# Patient Record
Sex: FEMALE | Race: WHITE
[De-identification: names, ages, dates, MRNs, and addresses within clinical notes are randomized per-mention and may not be internally consistent; named-entity substitution may affect disease eponyms.]

---

## 2017-07-25 NOTE — MM
Reason for exam: screening  (asymptomatic).

Last mammogram was performed 3 years ago.



Physical Findings:

A clinical breast exam by your physician is recommended on an annual basis and 

results should be correlated with mammographic findings.



MG Screening Mammo w CAD

Bilateral CC and MLO view(s) were taken.

Prior study comparison: July 24, 2014, right breast MG diagnostic mammo RT w CAD. 

December 5, 2013, [facility] DIGITAL RIGHT MAMMOGRAM w/CAD.

The breast tissue is heterogeneously dense. This may lower the sensitivity of 

mammography.  There is no discrete abnormality including area of concern.  No 

significant changes when compared with prior studies.





ASSESSMENT: Incomplete: need additional imaging evaluation, BI-RAD 0



RECOMMENDATION:

Ultrasound of the right breast.



[facility] will attempt to contact patient  to return for ultrasound.

## 2017-07-26 NOTE — USB
Reason for exam: additional evaluation requested from abnormal screening.



Physical Findings:

Nurse Summary: noticed "large" lump 1 week ago, painful (nurse kp).



US Breast Workup Limited RT

Right breast ultrasound demonstrates a 1.8 x 1.5 x 0.6cm oval, solid, hypoechoic 

lesion at 12 o'clock.



These results were verbally communicated with the patient and result sheet given 

to the patient on 7/26/17.





ASSESSMENT: Suspicious, BI-RAD 4



RECOMMENDATION:

Ultrasound core biopsy of the right breast.



Called Dr. Stromberg with mammographic findings and has scheduled an appointment 

for the patient for 8/17/17 at 9:45 with Dr. Mauro.

Biopsy scheduled for 7/26/17 at 1 o'clock.





PRELIMINARY REPORT CALLED AND FAXED TO DR. MAURO ON 7/26/17 /TP.

## 2017-07-26 NOTE — USB
EXAMINATION TYPE: US biopsy breast VAD RT

 

DATE OF EXAM: 7/26/2017

 

CLINICAL HISTORY: R92.8 ABN MAMMO.

 

TECHNIQUE: Ultrasound guided core biopsy of right 12:00 breast.  

 

COMPARISON: NONE

 

FINDINGS: The procedure of ultrasound guided core biopsy was explained to the patient.  Benefits, alt
ernatives, and risks were discussed.  An informed consent was then obtained.  

 

The patient was placed in supine positioning for  imaging and for the procedure. The overlying skin w
as prepped and draped in usual sterile fashion.  Lidocaine buffered with bicarbonate was used as anes
thetic into the skin and subcutaneous tissue up to area of concern in the right breast.  A rodríguez was m
kathie with surgical scalpel.  

 

Under ultrasound guidance, a 12-gauge vacuum assisted biopsy gun device was used to obtain 4 core cecilia
ples.  Following this, a biopsy clip was left in lesion.  

 

The patient tolerated the procedure well without any immediate complication.  The patient was kept in
 the radiology department for short stay after the procedure and then discharged home in stable condi
tion.  

 

 

 

IMPRESSION: Successful, uncomplicated ultrasound guided core biopsy of area of concern in the right 1
2:00 breast, full pathology results to follow.

## 2018-05-09 NOTE — US
EXAMINATION TYPE: US thyroid st tissue head/neck

 

DATE OF EXAM: 5/9/2018

 

COMPARISON: 7/24/2015

 

CLINICAL HISTORY: E04.2 MULTINODULAR GOITER. follow up exam

 

GLAND SIZE:

 

Right Lobe: 5.3 x 1.8 x 2.1 cm

** Overall Parenchyma:  heterogenous

Left Lobe: 4.9 x 1.6 x 1.4 cm

** Overall Parenchyma:  heterogeneous

Isthmus Thickness:  0.3 cm

 

NODULES

 

RIGHT:   # of nodules measured on right: 3

1.  1.6 X 1.2  x 0.9 cm hypoechoic solid nodule at the mid pole with mid well-definedmargins.  This n
odule is wider than tall and shows intranodular vascularity.

** Prior size: 1.5 x 1.2  x 0.9 cm 

2. 0.8 X 0.7  x 0.6 cm  solid nodule at the mid pole with well-defined margins.  This nodule is wider
 than tall and shows intranodular vascularity.

 ** Prior size:  0.8 x 0.5  x 0.7 cm 

3. 0.8 X 0.8  x 0.5 cm mixed nodule at the lower pole with well-defined margins.  This nodule is wide
r than tall and shows intranodular vascularity.

** Prior size:  unsure if noted previously

 

 

LEFT:    # of nodules measured on left:  1

1.  1.4 X 1.0  x 0.9 cm isoechoic solid nodule at the lower pole with well-defined margins.  This nod
ule is wider than tall and shows intranodular vascularity.

** Prior size: 1.3 x 1.0  x 0.7 cm 

 

ISTHMUS:    # of nodules measured in the isthmus:  0

 

 

Bilateral neck scanned, no evidence of lymphadenopathy.

 

 

 

 

 

IMPRESSION:

Findings consistent with a mild multinodular goiter. No dominant thyroid mass. No adverse change comp
ared to old exam.

## 2020-03-04 ENCOUNTER — HOSPITAL ENCOUNTER (EMERGENCY)
Dept: HOSPITAL 47 - EC | Age: 50
Discharge: HOME | End: 2020-03-04
Payer: COMMERCIAL

## 2020-03-04 VITALS
TEMPERATURE: 98.4 F | HEART RATE: 88 BPM | SYSTOLIC BLOOD PRESSURE: 133 MMHG | RESPIRATION RATE: 18 BRPM | DIASTOLIC BLOOD PRESSURE: 71 MMHG

## 2020-03-04 DIAGNOSIS — Z91.048: ICD-10-CM

## 2020-03-04 DIAGNOSIS — Z87.891: ICD-10-CM

## 2020-03-04 DIAGNOSIS — Q07.00: ICD-10-CM

## 2020-03-04 DIAGNOSIS — J15.9: Primary | ICD-10-CM

## 2020-03-04 LAB
ALBUMIN SERPL-MCNC: 4.5 G/DL (ref 3.5–5)
ALP SERPL-CCNC: 42 U/L (ref 38–126)
ALT SERPL-CCNC: 22 U/L (ref 4–34)
ANION GAP SERPL CALC-SCNC: 9 MMOL/L
APTT BLD: 22.9 SEC (ref 22–30)
AST SERPL-CCNC: 24 U/L (ref 14–36)
BASOPHILS # BLD AUTO: 0 K/UL (ref 0–0.2)
BASOPHILS NFR BLD AUTO: 0 %
BUN SERPL-SCNC: 13 MG/DL (ref 7–17)
CALCIUM SPEC-MCNC: 9.4 MG/DL (ref 8.4–10.2)
CHLORIDE SERPL-SCNC: 104 MMOL/L (ref 98–107)
CO2 SERPL-SCNC: 25 MMOL/L (ref 22–30)
D DIMER PPP FEU-MCNC: 0.22 MG/L FEU (ref ?–0.6)
EOSINOPHIL # BLD AUTO: 0 K/UL (ref 0–0.7)
EOSINOPHIL NFR BLD AUTO: 0 %
ERYTHROCYTE [DISTWIDTH] IN BLOOD BY AUTOMATED COUNT: 5.04 M/UL (ref 3.8–5.4)
ERYTHROCYTE [DISTWIDTH] IN BLOOD: 12.7 % (ref 11.5–15.5)
GLUCOSE SERPL-MCNC: 133 MG/DL (ref 74–99)
HCT VFR BLD AUTO: 43.7 % (ref 34–46)
HGB BLD-MCNC: 14.3 GM/DL (ref 11.4–16)
INR PPP: 0.9 (ref ?–1.2)
LYMPHOCYTES # SPEC AUTO: 0.5 K/UL (ref 1–4.8)
LYMPHOCYTES NFR SPEC AUTO: 7 %
MAGNESIUM SPEC-SCNC: 2.1 MG/DL (ref 1.6–2.3)
MCH RBC QN AUTO: 28.4 PG (ref 25–35)
MCHC RBC AUTO-ENTMCNC: 32.7 G/DL (ref 31–37)
MCV RBC AUTO: 86.7 FL (ref 80–100)
MONOCYTES # BLD AUTO: 0.2 K/UL (ref 0–1)
MONOCYTES NFR BLD AUTO: 2 %
NEUTROPHILS # BLD AUTO: 5.8 K/UL (ref 1.3–7.7)
NEUTROPHILS NFR BLD AUTO: 89 %
PLATELET # BLD AUTO: 166 K/UL (ref 150–450)
POTASSIUM SERPL-SCNC: 4.3 MMOL/L (ref 3.5–5.1)
PROT SERPL-MCNC: 7.5 G/DL (ref 6.3–8.2)
PT BLD: 9.5 SEC (ref 9–12)
SODIUM SERPL-SCNC: 138 MMOL/L (ref 137–145)
WBC # BLD AUTO: 6.5 K/UL (ref 3.8–10.6)

## 2020-03-04 PROCEDURE — 96365 THER/PROPH/DIAG IV INF INIT: CPT

## 2020-03-04 PROCEDURE — 71275 CT ANGIOGRAPHY CHEST: CPT

## 2020-03-04 PROCEDURE — 85610 PROTHROMBIN TIME: CPT

## 2020-03-04 PROCEDURE — 71046 X-RAY EXAM CHEST 2 VIEWS: CPT

## 2020-03-04 PROCEDURE — 84484 ASSAY OF TROPONIN QUANT: CPT

## 2020-03-04 PROCEDURE — 83880 ASSAY OF NATRIURETIC PEPTIDE: CPT

## 2020-03-04 PROCEDURE — 85379 FIBRIN DEGRADATION QUANT: CPT

## 2020-03-04 PROCEDURE — 85025 COMPLETE CBC W/AUTO DIFF WBC: CPT

## 2020-03-04 PROCEDURE — 96375 TX/PRO/DX INJ NEW DRUG ADDON: CPT

## 2020-03-04 PROCEDURE — 36415 COLL VENOUS BLD VENIPUNCTURE: CPT

## 2020-03-04 PROCEDURE — 94640 AIRWAY INHALATION TREATMENT: CPT

## 2020-03-04 PROCEDURE — 85730 THROMBOPLASTIN TIME PARTIAL: CPT

## 2020-03-04 PROCEDURE — 80053 COMPREHEN METABOLIC PANEL: CPT

## 2020-03-04 PROCEDURE — 99285 EMERGENCY DEPT VISIT HI MDM: CPT

## 2020-03-04 PROCEDURE — 83735 ASSAY OF MAGNESIUM: CPT

## 2020-03-04 PROCEDURE — 87502 INFLUENZA DNA AMP PROBE: CPT

## 2020-03-04 PROCEDURE — 96361 HYDRATE IV INFUSION ADD-ON: CPT

## 2020-03-04 NOTE — XR
EXAMINATION TYPE: XR chest 2V

 

DATE OF EXAM: 3/4/2020

 

COMPARISON: NONE

 

HISTORY: Cough for 5 days.

 

TECHNIQUE:  Frontal and lateral views of the chest are obtained.

 

FINDINGS:  There is no focal air space opacity, pleural effusion, or pneumothorax seen.  The cardiac 
silhouette size is within normal limits.   The osseous structures are intact. Cholecystectomy clips n
oted.

 

IMPRESSION:  No suspicious acute pulmonary process.

## 2020-03-04 NOTE — ED
URI HPI





- General


Chief Complaint: Upper Respiratory Infection


Stated Complaint: MARIA TERESA/poss bronchitis


Time Seen by Provider: 20 16:19


Source: patient, RN notes reviewed, old records reviewed


Mode of arrival: ambulatory


Limitations: no limitations





- History of Present Illness


Initial Comments: 





This is a 49 female to the ED co sob cough and SOB, patient has no medical 

history has been on steroids and breathing treatments and inhaler with no 

significant improvement.  Patient has had continued cough and sob despite 

treatment.  Patient denies chest pain.


MD Complaint: fever, cough, nasal congestion


-: days(s)


Severity: moderate


Severity scale (1-10): 4


Quality: aching


Consistency: constant


Improves With: nothing


Worsens With: nothing


Associated Symptoms: denies other symptoms


Treatments Prior to Arrival: none





- Related Data


                                  Previous Rx's











 Medication  Instructions  Recorded


 


Albuterol Nebulized [Ventolin 2.5 mg INHALATION Q4H PRN #25 nebu 20





Nebulized]  


 


Azithromycin [Zithromax Z-pack] 0 mg PO AS DIRECTED #1 pack 20











                                    Allergies











Allergy/AdvReac Type Severity Reaction Status Date / Time


 


nickel Allergy  Rash/Hives Verified 20 16:03





   /RED SKIN  














Review of Systems


ROS Statement: 


Those systems with pertinent positive or pertinent negative responses have been 

documented in the HPI.





ROS Other: All systems not noted in ROS Statement are negative.





Past Medical History


Past Medical History: No Reported History


Additional Past Medical History / Comment(s): Arnold Chiari - brain malformation

,HIATAL HERNIA,


History of Any Multi-Drug Resistant Organisms: None Reported


Past Surgical History: Ablation,  Section, Cholecystectomy


Additional Past Surgical History / Comment(s): , emergency , 

vertical incisions,


Past Anesthesia/Blood Transfusion Reactions: Previous Problems w/ Anesthesia, 

Postoperative Nausea & Vomiting (PONV)


Additional Past Anesthesia/Blood Transfusion Reaction / Comment(s): during 

emergency  - SEE REPORTS-"FELT PAIN DURING GENERAL ANESTHETIC"


Past Psychological History: Anxiety


Smoking Status: Former smoker


Past Alcohol Use History: None Reported


Past Drug Use History: None Reported





- Past Family History


  ** Father


Family Medical History: Cancer


Additional Family Medical History / Comment(s): throat ca - biological father





  ** Mother


Family Medical History: Cancer


Additional Family Medical History / Comment(s): OVARIAN CANCER





General Exam


Limitations: no limitations


General appearance: alert, in no apparent distress


Head exam: Present: atraumatic, normocephalic, normal inspection


Eye exam: Present: normal appearance, PERRL, EOMI.  Absent: scleral icterus, 

conjunctival injection, periorbital swelling


ENT exam: Present: normal exam, mucous membranes moist


Neck exam: Present: normal inspection.  Absent: tenderness, meningismus, 

lymphadenopathy


Respiratory exam: Present: respiratory distress, wheezes, accessory muscle use, 

decreased breath sounds, prolonged expiratory.  Absent: rales, rhonchi, stridor


Cardiovascular Exam: Present: regular rate, normal rhythm, normal heart sounds. 

Absent: systolic murmur, diastolic murmur, rubs, gallop, clicks


GI/Abdominal exam: Present: soft, normal bowel sounds.  Absent: distended, 

tenderness, guarding, rebound, rigid


Extremities exam: Present: normal inspection, full ROM, normal capillary refill.

 Absent: tenderness, pedal edema, joint swelling, calf tenderness


Back exam: Present: normal inspection


Neurological exam: Present: alert, oriented X3, CN II-XII intact


Psychiatric exam: Present: normal affect, normal mood


Skin exam: Present: warm, dry, intact, normal color.  Absent: rash





Course


                                   Vital Signs











  20





  16:00 16:25 17:36


 


Temperature 98.2 F  


 


Pulse Rate 94  91


 


Respiratory 19 22 





Rate   


 


Blood Pressure 146/94  


 


O2 Sat by Pulse 99  





Oximetry   














  20





  17:55 19:00


 


Temperature  


 


Pulse Rate 99 102 H


 


Respiratory  22





Rate  


 


Blood Pressure  131/73


 


O2 Sat by Pulse  100





Oximetry  














- Reevaluation(s)


Reevaluation #1: 





20 19:19


medical record is reviewed


Reevaluation #2: 





20 19:59


Patient has no pain no shortness of breath feeling better with like discharge 

home


Reevaluation #3: 





20 19:59


Patient informed of results questions are answered, would like discharge





Medical Decision Making





- Medical Decision Making





49 female DF for persistent cough and congestion shortness of breath positive 

pneumonia here in the ER.  At this time patient can be discharged home





- Lab Data


Result diagrams: 


                                 20 17:15





                                 20 17:15


                                   Lab Results











  20 Range/Units





  16:04 17:15 17:15 


 


WBC   6.5   (3.8-10.6)  k/uL


 


RBC   5.04   (3.80-5.40)  m/uL


 


Hgb   14.3   (11.4-16.0)  gm/dL


 


Hct   43.7   (34.0-46.0)  %


 


MCV   86.7   (80.0-100.0)  fL


 


MCH   28.4   (25.0-35.0)  pg


 


MCHC   32.7   (31.0-37.0)  g/dL


 


RDW   12.7   (11.5-15.5)  %


 


Plt Count   166   (150-450)  k/uL


 


Neutrophils %   89   %


 


Lymphocytes %   7   %


 


Monocytes %   2   %


 


Eosinophils %   0   %


 


Basophils %   0   %


 


Neutrophils #   5.8   (1.3-7.7)  k/uL


 


Lymphocytes #   0.5 L   (1.0-4.8)  k/uL


 


Monocytes #   0.2   (0-1.0)  k/uL


 


Eosinophils #   0.0   (0-0.7)  k/uL


 


Basophils #   0.0   (0-0.2)  k/uL


 


PT    9.5  (9.0-12.0)  sec


 


INR    0.9  (<1.2)  


 


APTT    22.9  (22.0-30.0)  sec


 


D-Dimer    0.22  (<0.60)  mg/L FEU


 


Sodium     (137-145)  mmol/L


 


Potassium     (3.5-5.1)  mmol/L


 


Chloride     ()  mmol/L


 


Carbon Dioxide     (22-30)  mmol/L


 


Anion Gap     mmol/L


 


BUN     (7-17)  mg/dL


 


Creatinine     (0.52-1.04)  mg/dL


 


Est GFR (CKD-EPI)AfAm     (>60 ml/min/1.73 sqM)  


 


Est GFR (CKD-EPI)NonAf     (>60 ml/min/1.73 sqM)  


 


Glucose     (74-99)  mg/dL


 


Calcium     (8.4-10.2)  mg/dL


 


Magnesium     (1.6-2.3)  mg/dL


 


Total Bilirubin     (0.2-1.3)  mg/dL


 


AST     (14-36)  U/L


 


ALT     (4-34)  U/L


 


Alkaline Phosphatase     ()  U/L


 


Troponin I     (0.000-0.034)  ng/mL


 


NT-Pro-B Natriuret Pep     pg/mL


 


Total Protein     (6.3-8.2)  g/dL


 


Albumin     (3.5-5.0)  g/dL


 


Influenza Type A RNA  Not Detected    (Not Detectd)  


 


Influenza Type B (PCR)  Not Detected    (Not Detectd)  














  20 Range/Units





  17:15 17:15 17:15 


 


WBC     (3.8-10.6)  k/uL


 


RBC     (3.80-5.40)  m/uL


 


Hgb     (11.4-16.0)  gm/dL


 


Hct     (34.0-46.0)  %


 


MCV     (80.0-100.0)  fL


 


MCH     (25.0-35.0)  pg


 


MCHC     (31.0-37.0)  g/dL


 


RDW     (11.5-15.5)  %


 


Plt Count     (150-450)  k/uL


 


Neutrophils %     %


 


Lymphocytes %     %


 


Monocytes %     %


 


Eosinophils %     %


 


Basophils %     %


 


Neutrophils #     (1.3-7.7)  k/uL


 


Lymphocytes #     (1.0-4.8)  k/uL


 


Monocytes #     (0-1.0)  k/uL


 


Eosinophils #     (0-0.7)  k/uL


 


Basophils #     (0-0.2)  k/uL


 


PT     (9.0-12.0)  sec


 


INR     (<1.2)  


 


APTT     (22.0-30.0)  sec


 


D-Dimer     (<0.60)  mg/L FEU


 


Sodium  138    (137-145)  mmol/L


 


Potassium  4.3    (3.5-5.1)  mmol/L


 


Chloride  104    ()  mmol/L


 


Carbon Dioxide  25    (22-30)  mmol/L


 


Anion Gap  9    mmol/L


 


BUN  13    (7-17)  mg/dL


 


Creatinine  0.57    (0.52-1.04)  mg/dL


 


Est GFR (CKD-EPI)AfAm  >90    (>60 ml/min/1.73 sqM)  


 


Est GFR (CKD-EPI)NonAf  >90    (>60 ml/min/1.73 sqM)  


 


Glucose  133 H    (74-99)  mg/dL


 


Calcium  9.4    (8.4-10.2)  mg/dL


 


Magnesium  2.1    (1.6-2.3)  mg/dL


 


Total Bilirubin  0.4    (0.2-1.3)  mg/dL


 


AST  24    (14-36)  U/L


 


ALT  22    (4-34)  U/L


 


Alkaline Phosphatase  42    ()  U/L


 


Troponin I   <0.012   (0.000-0.034)  ng/mL


 


NT-Pro-B Natriuret Pep    172  pg/mL


 


Total Protein  7.5    (6.3-8.2)  g/dL


 


Albumin  4.5    (3.5-5.0)  g/dL


 


Influenza Type A RNA     (Not Detectd)  


 


Influenza Type B (PCR)     (Not Detectd)  














- Radiology Data


Radiology results: report reviewed (Chest x-ray CT chest does show infiltrate 

positive for pneumonia), image reviewed





Disposition


Clinical Impression: 


 Community acquired bacterial pneumonia





Disposition: HOME SELF-CARE


Condition: Good


Instructions (If sedation given, give patient instructions):  Community Acquired

Pneumonia (ED)


Prescriptions: 


Albuterol Nebulized [Ventolin Nebulized] 2.5 mg INHALATION Q4H PRN #25 nebu


 PRN Reason: Shortness Of Breath


Azithromycin [Zithromax Z-pack] 0 mg PO AS DIRECTED #1 pack


Is patient prescribed a controlled substance at d/c from ED?: No


Referrals: 


Josue Tamayo Jr,  [Primary Care Provider] - 1-2 days

## 2020-03-04 NOTE — CT
EXAMINATION TYPE: CT angio chest

 

DATE OF EXAM: 3/4/2020

 

COMPARISON: None

 

HISTORY: Difficulty breathing.

 

CT DLP: 636.9 mGycm

Automated exposure control for dose reduction was used.

 

CONTRAST: 

Performed with IV Contrast, patient injected with 100 mL of Isovue 370.

There are 3-D post processed images.

 

There is some patchy groundglass interstitial infiltrate in the lower lung fields. There is no pleura
l effusion. There is no pulmonary mass. Heart size is normal. There is no pericardial effusion.

 

There is normal contrast opacification of the pulmonary arteries. There are no filling defects. There
 is no mediastinal adenopathy. Thoracic aorta is intact. There are no hilar masses. The bony thorax i
s intact. There is minor spurring in the thoracic spine.

 

IMPRESSION:

No evidence of pulmonary embolism. Mild pulmonary interstitial infiltrates.

## 2020-03-07 ENCOUNTER — HOSPITAL ENCOUNTER (EMERGENCY)
Age: 50
Discharge: HOME | End: 2020-03-07
Payer: COMMERCIAL

## 2020-03-07 PROCEDURE — 83605 ASSAY OF LACTIC ACID: CPT

## 2020-03-07 PROCEDURE — 80053 COMPREHEN METABOLIC PANEL: CPT

## 2020-03-07 PROCEDURE — 99285 EMERGENCY DEPT VISIT HI MDM: CPT

## 2020-03-07 PROCEDURE — 96360 HYDRATION IV INFUSION INIT: CPT

## 2020-03-07 PROCEDURE — 85730 THROMBOPLASTIN TIME PARTIAL: CPT

## 2020-03-07 PROCEDURE — 83880 ASSAY OF NATRIURETIC PEPTIDE: CPT

## 2020-03-07 PROCEDURE — 71046 X-RAY EXAM CHEST 2 VIEWS: CPT

## 2020-03-07 PROCEDURE — 93005 ELECTROCARDIOGRAM TRACING: CPT

## 2020-03-07 PROCEDURE — 85610 PROTHROMBIN TIME: CPT

## 2020-03-07 PROCEDURE — 36415 COLL VENOUS BLD VENIPUNCTURE: CPT

## 2020-03-07 PROCEDURE — 85025 COMPLETE CBC W/AUTO DIFF WBC: CPT

## 2020-03-07 PROCEDURE — 94640 AIRWAY INHALATION TREATMENT: CPT

## 2020-11-10 ENCOUNTER — HOSPITAL ENCOUNTER (EMERGENCY)
Dept: HOSPITAL 47 - EC | Age: 50
Discharge: HOME | End: 2020-11-10
Payer: COMMERCIAL

## 2020-11-10 VITALS
HEART RATE: 99 BPM | SYSTOLIC BLOOD PRESSURE: 140 MMHG | TEMPERATURE: 97.9 F | DIASTOLIC BLOOD PRESSURE: 82 MMHG | RESPIRATION RATE: 20 BRPM

## 2020-11-10 DIAGNOSIS — R05: Primary | ICD-10-CM

## 2020-11-10 DIAGNOSIS — R06.02: ICD-10-CM

## 2020-11-10 DIAGNOSIS — Z20.828: ICD-10-CM

## 2020-11-10 DIAGNOSIS — Z91.048: ICD-10-CM

## 2020-11-10 PROCEDURE — 87635 SARS-COV-2 COVID-19 AMP PRB: CPT

## 2020-11-10 PROCEDURE — 93005 ELECTROCARDIOGRAM TRACING: CPT

## 2020-11-10 PROCEDURE — 99285 EMERGENCY DEPT VISIT HI MDM: CPT

## 2020-11-10 PROCEDURE — 71046 X-RAY EXAM CHEST 2 VIEWS: CPT

## 2020-11-10 NOTE — ED
SOB HPI





- General


Chief Complaint: Shortness of Breath


Stated Complaint: MARIA TERESA


Time Seen by Provider: 11/10/20 04:49


Source: patient


Mode of arrival: ambulatory


Limitations: no limitations





- History of Present Illness


Initial Comments: 


Lorrie is a 50-year-old female who presents the ER today for evaluation of 

nonproductive cough and shortness of breath.  Patient reports that she was 

tested for COVID last week and was negative.  Patient reports that she continues

to have a nonproductive cough she states that this is similar to previous 

episodes of pneumonia and she is very concerned she may be developing pneumonia 

so she returned to the ER today for reevaluation.








- Related Data


                                  Previous Rx's











 Medication  Instructions  Recorded


 


Albuterol Nebulized [Ventolin 2.5 mg INHALATION Q4H PRN #25 nebu 20





Nebulized]  


 


Azithromycin [Zithromax Z-pack (6 0 mg PO AS DIRECTED #1 pack 20





tabs)]  


 


Amoxic-Pot Clav 875-125Mg 1 tab PO Q12HR 10 Days #20 tab 20





[Augmentin 875-125]  


 


Fluconazole [Diflucan] 150 mg PO ONCE #2 tab 20


 


Ipratropium Nebulized [Atrovent 0.5 mg INHALATION Q6HR #25 neb 20





Nebulized 0.2 MG/ML]  


 


Amoxicillin/Potassium Clav 1 tab PO BID 7 Days #14 tab 11/10/20





[Augmentin 875-125 Tablet]  


 


Ipratropium-Albuterol Nebulize 3 ml INHALATION Q4H #30 neb 11/10/20





[Duoneb 0.5 mg-3 mg/3 ml Soln]  


 


predniSONE [Deltasone] 40 mg PO DAILY 5 Days #10 tab 11/10/20











                                    Allergies











Allergy/AdvReac Type Severity Reaction Status Date / Time


 


adhesive tape Allergy  Rash/Hives Verified 11/10/20 04:46


 


nickel Allergy  Rash/Hives Verified 20 08:19





   /RED SKIN  














Review of Systems


ROS Statement: 


Those systems with pertinent positive or pertinent negative responses have been 

documented in the HPI.





ROS Other: All systems not noted in ROS Statement are negative.





Past Medical History


Past Medical History: No Reported History


Additional Past Medical History / Comment(s): Arnold Chiari - brain malformation

 ,HIATAL HERNIA,


History of Any Multi-Drug Resistant Organisms: None Reported


Past Surgical History: Ablation,  Section, Cholecystectomy


Additional Past Surgical History / Comment(s): 2011, emergency , 

vertical incisions,


Past Anesthesia/Blood Transfusion Reactions: Previous Problems w/ Anesthesia, 

Postoperative Nausea & Vomiting (PONV)


Additional Past Anesthesia/Blood Transfusion Reaction / Comment(s): during 

emergency  - SEE REPORTS-"FELT PAIN DURING GENERAL ANESTHETIC"


Past Psychological History: Anxiety


Smoking Status: Never smoker


Past Alcohol Use History: None Reported


Past Drug Use History: None Reported





- Past Family History


  ** Father


Family Medical History: Cancer


Additional Family Medical History / Comment(s): throat ca - biological father





  ** Mother


Family Medical History: Cancer


Additional Family Medical History / Comment(s): OVARIAN CANCER





General Exam





- General Exam Comments


Initial Comments: 


Physical Exam


GENERAL:


Patient is well-developed and well-nourished.  Patient is nontoxic and well-

hydrated and is in no distress.





HENT:


Normocephalic, Atraumatic. 





EYES:


PERRL, EOMI





PULMONARY:


Unlabored respirations.  No audible rales rhonchi or wheezing was noted.





CARDIOVASCULAR:


There is a regular rate and rhythm without any murmurs gallops or rubs.  





ABDOMEN:


Soft and nontender with normal bowel sounds. 





SKIN:


Skin is clear with no lesions or rashes and otherwise unremarkable.





: 


Deferred





NEUROLOGIC:


Patient is alert and oriented x3.  Moving all extremities spontaneously





MUSCULOSKELETAL:


Normal extremities with adequate strength and full range of motion.  No lower 

extremity swelling or edema.  No calf tenderness.  





PSYCHIATRIC:


Normal psychiatric evaluation.





Limitations: no limitations





Course


                                   Vital Signs











  11/10/20





  04:41


 


Temperature 97.9 F


 


Pulse Rate 99


 


Respiratory 20





Rate 


 


Blood Pressure 140/82


 


O2 Sat by Pulse 100





Oximetry 














Medical Decision Making





- Medical Decision Making


upon initial evaluation patient is not tachycardic, tachypneic or hypoxic


She does have a dry nonproductive cough


Chest x-ray was obtained, there is no evidence of acute pneumonia howeverhuman 

the patient's symptoms we will treat with steroids and antibiotics


Close follow-up was encouraged





results were discussed with patient and she began crying stating that she just 

feels emotionally overwhelmed by the COVID 19 pandemic, she lost her son last 

year she reports she feels like she is just not coping emotionally and this is 

contributing to her feeling sick and fatigued all the time.  Advised the patient

 to discuss this with her primary care and perhaps counseling.  Patient was 

agreeable and thankful





- Lab Data


                                   Lab Results











  11/10/20 Range/Units





  05:43 


 


Coronavirus (PCR)  Not Detected  (Not Detectd)  














- EKG Data


-: EKG Interpreted by Me


EKG shows normal: sinus rhythm


EKG Comments: 


EKG was obtained due to shortness of breath, EKG was obtained at 4:53 AM rate is

 85 rhythm is sinus there is a normal axis, normal intervals,  QRS 96 QTC 

456 there are no acute ST elevations or depressions there is no evidence of 

acute ischemia or infarction. respiratory artifact noted








Disposition


Clinical Impression: 


 Cough





Disposition: HOME SELF-CARE


Condition: Stable


Additional Instructions: 


As we discussed you could be developing pneumonia, begin antibiotics, take 

steroids daily, take duo-nebs as needed





Follow up with regular doctor for repeat chest xray by end of week





Return to the ER for any worsening





Prescriptions: 


Amoxicillin/Potassium Clav [Augmentin 875-125 Tablet] 1 tab PO BID 7 Days #14 

tab


predniSONE [Deltasone] 40 mg PO DAILY 5 Days #10 tab


Ipratropium-Albuterol Nebulize [Duoneb 0.5 mg-3 mg/3 ml Soln] 3 ml INHALATION 

Q4H #30 neb


Is patient prescribed a controlled substance at d/c from ED?: No


Referrals: 


Josue Tamayo Jr,  [Primary Care Provider] - 1-2 days

## 2020-11-10 NOTE — XR
EXAM:

  XR Chest, 2 Views

 

CLINICAL HISTORY:

  ITS.REASON XR Reason: cough

 

TECHNIQUE:

  Frontal and lateral views of the chest.

 

COMPARISON:

  .  3/7/20

 

FINDINGS:

  Lungs:  Minimal streaky densities in the lung bases.  No dense 

consolidation or effusion.

  Pleural space:  Unremarkable.  No pneumothorax.

  Heart:  Unremarkable.  No cardiomegaly.

  Mediastinum:  Unremarkable.

  Bones/joints:  Unremarkable.

 

IMPRESSION:     

1.  Probable mild residual atelectasis in the lung bases.

2.  Follow-up could be obtained to exclude developing infiltrate.

3.  No effusion

## 2020-12-15 ENCOUNTER — HOSPITAL ENCOUNTER (EMERGENCY)
Dept: HOSPITAL 47 - EC | Age: 50
Discharge: HOME | End: 2020-12-15
Payer: COMMERCIAL

## 2020-12-15 VITALS — RESPIRATION RATE: 18 BRPM | SYSTOLIC BLOOD PRESSURE: 115 MMHG | DIASTOLIC BLOOD PRESSURE: 73 MMHG | HEART RATE: 63 BPM

## 2020-12-15 VITALS — TEMPERATURE: 98.5 F

## 2020-12-15 DIAGNOSIS — U07.1: Primary | ICD-10-CM

## 2020-12-15 DIAGNOSIS — Z90.49: ICD-10-CM

## 2020-12-15 DIAGNOSIS — Z91.048: ICD-10-CM

## 2020-12-15 PROCEDURE — 71045 X-RAY EXAM CHEST 1 VIEW: CPT

## 2020-12-15 PROCEDURE — 99285 EMERGENCY DEPT VISIT HI MDM: CPT

## 2020-12-15 PROCEDURE — 93005 ELECTROCARDIOGRAM TRACING: CPT

## 2020-12-15 PROCEDURE — 87636 SARSCOV2 & INF A&B AMP PRB: CPT

## 2020-12-15 NOTE — ED
General Adult HPI





- General


Chief complaint: Upper Respiratory Infection


Stated complaint: Fever,SOB,Cough


Time Seen by Provider: 12/15/20 21:46


Source: patient


Mode of arrival: ambulatory


Limitations: no limitations





- History of Present Illness


Initial comments: 





50-year-old female presenting to the emergency department with cough chills and 

shortness of breath.  Patient reports she said a chronic cough for the past 

several months but has gotten worse since over the last 3 days.  Patient also 

reports having some shortness of breath but denies any chest pain.  Patient 

reports no she is also loss a sense of taste and smell.  She denies any nausea 

or vomiting but does report one episode of diarrhea today.  States that her 

 was exposed to somebody who already tested positive for Covid.  She also

reports some clear bilateral rhinorrhea and a sore throat but denies any 

otalgia.  Denies history of smoking as well as COPD.





- Related Data


                                  Previous Rx's











 Medication  Instructions  Recorded


 


Albuterol Nebulized [Ventolin 2.5 mg INHALATION Q4H PRN #25 nebu 20





Nebulized]  


 


Azithromycin [Zithromax Z-pack (6 0 mg PO AS DIRECTED #1 pack 20





tabs)]  


 


Amoxic-Pot Clav 875-125Mg 1 tab PO Q12HR 10 Days #20 tab 20





[Augmentin 875-125]  


 


Fluconazole [Diflucan] 150 mg PO ONCE #2 tab 20


 


Ipratropium Nebulized [Atrovent 0.5 mg INHALATION Q6HR #25 neb 20





Nebulized 0.2 MG/ML]  


 


Amoxicillin/Potassium Clav 1 tab PO BID 7 Days #14 tab 11/10/20





[Augmentin 875-125 Tablet]  


 


Ipratropium-Albuterol Nebulize 3 ml INHALATION Q4H #30 neb 11/10/20





[Duoneb 0.5 mg-3 mg/3 ml Soln]  


 


predniSONE [Deltasone] 40 mg PO DAILY 5 Days #10 tab 11/10/20











                                    Allergies











Allergy/AdvReac Type Severity Reaction Status Date / Time


 


adhesive tape Allergy  Rash/Hives Verified 12/15/20 21:41


 


nickel Allergy  Rash/Hives Verified 12/15/20 21:41





   /RED SKIN  














Review of Systems


ROS Statement: 


Those systems with pertinent positive or pertinent negative responses have been 

documented in the HPI.





ROS Other: All systems not noted in ROS Statement are negative.





Past Medical History


Past Medical History: No Reported History


Additional Past Medical History / Comment(s): Arnold Chiari - brain malformation

 ,HIATAL HERNIA,


History of Any Multi-Drug Resistant Organisms: None Reported


Past Surgical History: Ablation,  Section, Cholecystectomy


Additional Past Surgical History / Comment(s): , emergency , 

vertical incisions,


Past Anesthesia/Blood Transfusion Reactions: Previous Problems w/ Anesthesia, 

Postoperative Nausea & Vomiting (PONV)


Additional Past Anesthesia/Blood Transfusion Reaction / Comment(s): during 

emergency  - SEE REPORTS-"FELT PAIN DURING GENERAL ANESTHETIC"


Past Psychological History: Anxiety


Smoking Status: Never smoker


Past Alcohol Use History: None Reported


Past Drug Use History: None Reported





- Past Family History


  ** Father


Family Medical History: Cancer


Additional Family Medical History / Comment(s): throat ca - biological father





  ** Mother


Family Medical History: Cancer


Additional Family Medical History / Comment(s): OVARIAN CANCER





General Exam


Limitations: no limitations


General appearance: alert, in no apparent distress, anxious


Head exam: Present: atraumatic, normocephalic, normal inspection


Eye exam: Present: normal appearance, PERRL, EOMI


Pupils: Present: normal accommodation


ENT exam: Present: normal exam, normal oropharynx, mucous membranes moist, TM's 

normal bilaterally, normal external ear exam


Neck exam: Present: normal inspection, full ROM.  Absent: tenderness


Respiratory exam: Present: normal lung sounds bilaterally.  Absent: respiratory 

distress, wheezes, rales, rhonchi, stridor, chest wall tenderness, decreased 

breath sounds, prolonged expiratory


Cardiovascular Exam: Present: regular rate, normal rhythm, normal heart sounds. 

 Absent: systolic murmur, diastolic murmur


GI/Abdominal exam: Present: soft.  Absent: distended, tenderness, guarding, 

rebound


Extremities exam: Present: normal inspection, full ROM, normal capillary refill.

  Absent: tenderness, pedal edema, joint swelling, calf tenderness


Back exam: Present: normal inspection, full ROM.  Absent: tenderness, CVA 

tenderness (R), CVA tenderness (L)


Neurological exam: Present: alert, oriented X3, normal gait


Psychiatric exam: Present: normal affect, anxious


Skin exam: Present: warm, dry, intact, normal color





Course


                                   Vital Signs











  12/15/20 12/15/20





  21:36 22:55


 


Temperature 98.5 F 98.5 F


 


Pulse Rate 88 63


 


Respiratory 20 18





Rate  


 


Blood Pressure 147/71 115/73


 


O2 Sat by Pulse 100 98





Oximetry  














EKG Findings





- EKG Comments:


EKG Findings:: Sinus rhythm with no ST or T-wave changes.  Ventricular rate 78, 

, QRS 96, .





Medical Decision Making





- Medical Decision Making





50-year-old male presenting to emergency Department with a chief complaint of 

fever cough shortness of breath.  On physical examination, patient is very 

anxious.  She does not appear to be any respiratory distress.  She is 100% on 

room air.  She is clear to auscultation.  Chest x-ray is unremarkable.  EKG 

showing sinus rhythm with no ST or T-wave changes.  Patient is  covid-19t 

positive and influenza negative.  Patient was advised to self isolate for 10 

days starting from the onset of symptoms.  She was advised to take Tylenol only.

  Some fever.  Strict return parameters were thoroughly discussed the patient 

was understanding ago.  Case discussed with physician.





- Lab Data


                                   Lab Results











  12/15/20 Range/Units





  21:56 


 


Influenza Type A (PCR)  Not Detected  (Not Detectd)  


 


Influenza Type B (PCR)  Not Detected  (Not Detectd)  


 


RSV (PCR)  Not Detected  (Not Detectd)  


 


SARS-CoV-2 (PCR)  Detected A  (Not Detectd)  














Disposition


Clinical Impression: 


 COVID-19, Cough





Disposition: HOME SELF-CARE


Condition: Stable


Instructions (If sedation given, give patient instructions):  Viral Syndrome 

(ED)


Additional Instructions: 


Take prescribed medication as directed.  Self isolate for 10 days starting from 

the onset of symptoms.  Take Tylenol if he developed fever.  Take 220 mg of 

zinc, 1000 units of vitamin D 3 and 500 mg vitamin C 3 times per day.


Is patient prescribed a controlled substance at d/c from ED?: No


Referrals: 


Josue Tamayo Jr,  [Primary Care Provider] - 1-2 days


Time of Disposition: 23:09

## 2022-10-26 ENCOUNTER — HOSPITAL ENCOUNTER (OUTPATIENT)
Dept: HOSPITAL 47 - ORWHC2ENDO | Age: 52
Discharge: HOME | End: 2022-10-26
Attending: SURGERY
Payer: COMMERCIAL

## 2022-10-26 VITALS — BODY MASS INDEX: 36.5 KG/M2

## 2022-10-26 VITALS — HEART RATE: 87 BPM | DIASTOLIC BLOOD PRESSURE: 74 MMHG | RESPIRATION RATE: 16 BRPM | SYSTOLIC BLOOD PRESSURE: 111 MMHG

## 2022-10-26 VITALS — TEMPERATURE: 97 F

## 2022-10-26 DIAGNOSIS — Z90.49: ICD-10-CM

## 2022-10-26 DIAGNOSIS — R05.9: ICD-10-CM

## 2022-10-26 DIAGNOSIS — Z12.11: Primary | ICD-10-CM

## 2022-10-26 DIAGNOSIS — K44.9: ICD-10-CM

## 2022-10-26 DIAGNOSIS — F41.9: ICD-10-CM

## 2022-10-26 DIAGNOSIS — K21.9: ICD-10-CM

## 2022-10-26 DIAGNOSIS — R21: ICD-10-CM

## 2022-10-26 DIAGNOSIS — K57.30: ICD-10-CM

## 2022-10-26 DIAGNOSIS — Z79.51: ICD-10-CM

## 2022-10-26 DIAGNOSIS — Z80.8: ICD-10-CM

## 2022-10-26 DIAGNOSIS — Z91.048: ICD-10-CM

## 2022-10-26 DIAGNOSIS — Z91.040: ICD-10-CM

## 2022-10-26 DIAGNOSIS — Q07.00: ICD-10-CM

## 2022-10-26 DIAGNOSIS — F10.90: ICD-10-CM

## 2022-10-26 DIAGNOSIS — K64.8: ICD-10-CM

## 2022-10-26 DIAGNOSIS — K91.0: ICD-10-CM

## 2022-10-26 DIAGNOSIS — Z98.891: ICD-10-CM

## 2022-10-26 DIAGNOSIS — Z98.890: ICD-10-CM

## 2022-10-26 DIAGNOSIS — Z79.899: ICD-10-CM

## 2022-10-26 DIAGNOSIS — Z87.891: ICD-10-CM

## 2022-10-26 DIAGNOSIS — Z79.82: ICD-10-CM

## 2022-10-26 PROCEDURE — 45378 DIAGNOSTIC COLONOSCOPY: CPT

## 2022-10-26 PROCEDURE — 81025 URINE PREGNANCY TEST: CPT

## 2022-10-26 NOTE — P.GSHP
History of Present Illness


H&P Date: 10/26/22














CHIEF COMPLAINT: Colon screen





HISTORY OF PRESENT ILLNESS: The patient is a 52-year-old female who


presents for colon screen.  Lower endoscopy was offered for further evaluation 

and management.





PAST MEDICAL HISTORY: 


Please see list.





PAST SURGICAL HISTORY: 


Please see list.





MEDICATIONS: 


Please see list.





ALLERGIES:  Please see list. 





SOCIAL HISTORY: No illicit drug use





FAMILY HISTORY: No reports of Crohn disease or ulcerative colitis. 





REVIEW OF ORGAN SYSTEMS: 


CONSTITUTIONAL: No reports of fevers or chills.  





PHYSICAL EXAM: 


VITAL SIGNS:  Stable


GENERAL: Well-developed pleasant in no acute distress. 


HEENT: No scleral icterus. Extraocular movements grossly


intact. Moist buccal mucosa. 


NECK: Supple without lymphadenopathy. 


CHEST: Unlabored respirations. Equal bilateral excursions. 


CARDIOVASCULAR: Regular rate and rhythm. Distal 2+ pulses. 


ABDOMEN: Soft, nontender, nondistended. 


MUSCULOSKELETAL: No clubbing, cyanosis, or edema. 





ASSESSMENT: 


1.  Colon screen.





PLAN: 


1. Recommend proceeding with a lower endoscopy





Past Medical History


Past Medical History: GERD/Reflux


Additional Past Medical History / Comment(s): Arnold Chiari - brain malformation

, hiatal hernia, hx of covid x2 with severe respiratory symptoms- now has 

chronic cough., hemorrhoids, constipation, rash in fold of abd apron., states 

multiple benign goiters., hx HPV.


History of Any Multi-Drug Resistant Organisms: None Reported


Past Surgical History:  Section, Cholecystectomy, Uterine Ablation


Additional Past Surgical History / Comment(s): emergency  (vertical 

incisions) , Repeat  , goiter bx .


Past Anesthesia/Blood Transfusion Reactions: Previous Problems w/ Anesthesia, 

Postoperative Nausea & Vomiting (PONV)


Additional Past Anesthesia/Blood Transfusion Reaction / Comment(s): felt 

everything during emergency .


Past Psychological History: Anxiety


Additional Psychological History / Comment(s): anxiety about anesthesia


Smoking Status: Former smoker


Past Alcohol Use History: Occasional


Additional Past Alcohol Use History / Comment(s): smoked [age] yrs old


Additional Drug Use History / Comment(s): cbd gummies at hx.





- Past Family History


  ** Father


Family Medical History: Cancer


Additional Family Medical History / Comment(s): throat ca





  ** Mother


Family Medical History: Cancer


Additional Family Medical History / Comment(s): FEMALE CANCER





Medications and Allergies


                                Home Medications











 Medication  Instructions  Recorded  Confirmed  Type


 


Albuterol Inhaler [Ventolin Hfa 1 puff INHALATION AS DIRECTED PRN 10/24/22 

10/26/22 History





Inhaler]    


 


Aspirin [Adult Low Dose Aspirin EC] 81 mg PO DAILY 10/24/22 10/26/22 History


 


Ibuprofen [Advil] 400 mg PO AS DIRECTED PRN 10/24/22 10/26/22 History


 


Multivit with Calcium,Iron,Min 1 each PO DAILY 10/24/22 10/26/22 History





[Women's Multivitamin]    








                                    Allergies











Allergy/AdvReac Type Severity Reaction Status Date / Time


 


adhesive tape Allergy  Rash/Hives Verified 10/26/22 08:41


 


nickel Allergy  Rash/Hives Verified 10/26/22 08:41





   /RED SKIN  





   like a burn  


 


METAL ALLERGY Allergy Severe Swelling, Uncoded 10/26/22 08:41





   skin red  





   like a burn  














Surgical - Exam


                                   Vital Signs











Temp Pulse Resp BP Pulse Ox


 


 97.0 F L  94   18   157/77   99 


 


 10/26/22 08:27  10/26/22 08:27  10/26/22 08:27  10/26/22 08:27  10/26/22 08:27

## 2022-10-26 NOTE — P.OP
Date of Procedure: 10/26/22


Description of Procedure: 








PREOPERATIVE DIAGNOSIS:


Colonoscopy screening, first





POSTOPERATIVE DIAGNOSIS:


Colonoscopy screening.


Diverticulosis, scattered.





OPERATION:


Colonoscopy to the cecum, ileocecal valve and appendiceal orifice.





SURGEON: Noemi Gamino MD.





ANESTHESIA: MAC.





INDICATIONS:


The patient is a 52-year-old female who presents for colonoscopy screening.  

Benefits and risks were described and informed consent was obtained.





DESCRIPTION OF PROCEDURE:


The patient had undergone Sutab prep.  The patient had been brought into the 

operating room and laid in the left lateral decubitus position. After adequate 

intravenous sedation, the rectum was examined with 2% lidocaine jelly. No extern

al hemorrhoids were encountered. The rectal tone was within normal limits. No 

lesions were palpated in the rectal vault. An Olympus colonoscope was advanced 

until the cecum, ileocecal valve and appendiceal orifice were clearly viewed.  

The prep was good. Scattered diverticulosis was encountered.  No colonic polyps 

were found.  No evidence of focal colitis was found. Retroflexion of the scope 

demonstrated grade 1 internal hemorrhoids without active bleeding or 

inflammation. The colon was desufflated. The patient had tolerated the procedure

well. 





Withdrawal time was over 6 minutes.





FINDINGS:


Aronchick preparation quality scale 2 (1-5)


Internal hemorrhoids, grade 1


No external prolapsed hemorrhoids.


No arteriovenous malformations.


No adenomatous polyps.


No focal colitis.


Sigmoid diverticulosis





RECOMMENDATIONS:


Lower endoscopy in 5 years, 2027





Plan - Discharge Summary


New Discharge Prescriptions: 


Continue


   Ibuprofen [Advil] 400 mg PO AS DIRECTED PRN


     PRN Reason: Pain


   Multivit with Calcium,Iron,Min [Women's Multivitamin] 1 each PO DAILY


   Albuterol Inhaler [Ventolin Hfa Inhaler] 1 puff INHALATION AS DIRECTED PRN


     PRN Reason: Shortness Of Breath


   Aspirin [Adult Low Dose Aspirin EC] 81 mg PO DAILY


Discharge Medication List





Albuterol Inhaler [Ventolin Hfa Inhaler] 1 puff INHALATION AS DIRECTED PRN 10

/24/22 [History]


Aspirin [Adult Low Dose Aspirin EC] 81 mg PO DAILY 10/24/22 [History]


Ibuprofen [Advil] 400 mg PO AS DIRECTED PRN 10/24/22 [History]


Multivit with Calcium,Iron,Min [Women's Multivitamin] 1 each PO DAILY 10/24/22 

[History]








Follow up Appointment(s)/Referral(s): 


Noemi Gamino MD [STAFF PHYSICIAN] - As Needed


Patient Instructions/Handouts:  Diverticulosis Diet (GEN), Diverticulitis Diet 

(ED)


Activity/Diet/Wound Care/Special Instructions: 


Repeat colonoscopy 5 years, 2027


Discharge Disposition: HOME SELF-CARE

## 2023-07-31 ENCOUNTER — HOSPITAL ENCOUNTER (OUTPATIENT)
Dept: HOSPITAL 47 - RADNMMAIN | Age: 53
Discharge: HOME | End: 2023-07-31
Attending: FAMILY MEDICINE
Payer: COMMERCIAL

## 2023-07-31 DIAGNOSIS — I20.8: Primary | ICD-10-CM

## 2023-07-31 PROCEDURE — 78452 HT MUSCLE IMAGE SPECT MULT: CPT

## 2023-07-31 PROCEDURE — 93017 CV STRESS TEST TRACING ONLY: CPT

## 2023-07-31 NOTE — NM
EXAMINATION TYPE: NM stress cardiolite complete

 

DATE OF EXAM: 7/31/2023

 

COMPARISON: NONE

 

CLINICAL INDICATION: Female, 52 years old with history of I20.8 OTHER FORMS OF ANGINA PECTORIS;

 

TECHNIQUE:  After the intravenous administration of 9.6 mCi Tc 99m Sestamibi - Rest images obtained 6
0 minutes post injection.  The patient exercised using a  JEROME protocol and 1 minute prior to peak e
xercise was injected with 26 mCi Tc 99m Sestamibi - Stress images obtained 20 minutes post injection.


 

FINDINGS: 

 

Targeted heart rate was achieved during performance of the study. Review of stress and rest SPECT maira
ges demonstrates no distinct perfusion abnormality.  Gated analysis shows normal wall motion with an 
estimated left ventricular ejection fraction of 67 %.

 

 

 

IMPRESSION:  

 

No scintigraphic evidence for reversible ischemia

## 2023-08-01 NOTE — CA
Exercise Nuclear Stress Test Report 

 

 Name:    Lorrie Rodarte 

 

 MRN:    [id number] 

 

 

 

 Exam Date: 2023 09:40 

 

 Exam Location:      [place]  

                     Stress 

 

 Ht (in):     68     Wt (lb):     229    BSA:    2.16 

 

 Ordering Phys:       Marycruz May DO 

 

 Referring Phys:      Emilia Calixto  

                      PAC 

 

 Technologist:        Griffin De Leon 

 

 Age:    52    Gender:    F 

 

 :    1970 

 Procedure CPT: 

 

 Indications:              I20.8 Other forms of angina pectoris 

 

 ICD-10 Codes: 

 

 Patient History:          Chest tightness and palpitations 

 

 Medications: 

 

 Meds past 24 hrs: 

 

 Pretest Chest Pain: 

 

 STRESS TEST      Agusto 

 

 Protocol 

 

 

 

 

 Exercise Duration (min:sec):         08:30 

 Max ST Depressions (mm): 

 Angina Score: 

 Coronel Score: 

 Resting HR (bpm):      75 

 

 Peak HR (bpm):         165 

 

 Resting BP (mmHg):       121    /   86 

 

 Peak BP (mmHg):             /   96 

 

 MPHR:    168     Target HR:      143 

 

 % MPHR:     98 

 METS:  10.3 

 

 Total Dose: 

 Peak Dose: 

 Atropine: 

 Double Product: 

 

 BP Response: 

 

 Stress Termination:       TARGET HR REACHED/MAX EXERTION 

 

 Stress Symptoms: 

 NO SYMPTOMS 

 

 Stress Summary: 

 

 

  ECG ANALYSIS 

 

 Resting ECG: 

 

 Stress ECG: 

 

 CONCLUSIONS 

 Patient underwent exercise stress Cardiolite with a Agusto  

 protocol treadmill stress test.  Patient exercised into Stage 3  

 for a total of 8 minutes and 30 seconds reaching a total of 10.3  

 METS.  Patient's maximum heart rate was 165 which represented 98  

 % age-predicted maximum heart rate. 

 

 Stress EKG findings: 

 At baseline patient's EKG showed normal sinus rhythm, normal  

 axis, no significant ST or T wave abnormalities.  At peak  

 exercise, EKG showed no significant change from baseline. 

 

 

 Conclusions: 

 1.  Normal EKG response to exercise without evidence of  

 inducible ischemia. 

 2.  Nuclear portion of a reported separately 

 2. [] exercise capacity. 

 

 Dr. Zan Spence DO 

 (Electronically Signed) 

 Final Date:      2023 07:13